# Patient Record
Sex: MALE | Race: WHITE | NOT HISPANIC OR LATINO | Employment: OTHER | URBAN - METROPOLITAN AREA
[De-identification: names, ages, dates, MRNs, and addresses within clinical notes are randomized per-mention and may not be internally consistent; named-entity substitution may affect disease eponyms.]

---

## 2017-05-31 ENCOUNTER — LAB VISIT (OUTPATIENT)
Dept: LAB | Facility: HOSPITAL | Age: 72
End: 2017-05-31
Attending: INTERNAL MEDICINE
Payer: MEDICARE

## 2017-05-31 DIAGNOSIS — C61 PROSTATE CANCER: ICD-10-CM

## 2017-05-31 DIAGNOSIS — Z12.5 SCREENING FOR PROSTATE CANCER: ICD-10-CM

## 2017-05-31 DIAGNOSIS — C7A.012 MALIGNANT CARCINOID TUMOR OF THE ILEUM: ICD-10-CM

## 2017-05-31 LAB
ALBUMIN SERPL BCP-MCNC: 4.4 G/DL
ALP SERPL-CCNC: 56 U/L
ALT SERPL W/O P-5'-P-CCNC: 47 U/L
ANION GAP SERPL CALC-SCNC: 8 MMOL/L
AST SERPL-CCNC: 39 U/L
BASOPHILS # BLD AUTO: 0.02 K/UL
BASOPHILS NFR BLD: 0.4 %
BILIRUB SERPL-MCNC: 0.6 MG/DL
BUN SERPL-MCNC: 18 MG/DL
CALCIUM SERPL-MCNC: 9.5 MG/DL
CHLORIDE SERPL-SCNC: 105 MMOL/L
CO2 SERPL-SCNC: 29 MMOL/L
COMPLEXED PSA SERPL-MCNC: <0.07 NG/ML
CREAT SERPL-MCNC: 1.18 MG/DL
DIFFERENTIAL METHOD: ABNORMAL
EOSINOPHIL # BLD AUTO: 0.2 K/UL
EOSINOPHIL NFR BLD: 3.2 %
ERYTHROCYTE [DISTWIDTH] IN BLOOD BY AUTOMATED COUNT: 13.9 %
EST. GFR  (AFRICAN AMERICAN): >60 ML/MIN/1.73 M^2
EST. GFR  (NON AFRICAN AMERICAN): >60 ML/MIN/1.73 M^2
GLUCOSE SERPL-MCNC: 91 MG/DL
HCT VFR BLD AUTO: 39.3 %
HGB BLD-MCNC: 13 G/DL
LYMPHOCYTES # BLD AUTO: 1.1 K/UL
LYMPHOCYTES NFR BLD: 20.4 %
MCH RBC QN AUTO: 28.4 PG
MCHC RBC AUTO-ENTMCNC: 33.1 %
MCV RBC AUTO: 86 FL
MONOCYTES # BLD AUTO: 0.5 K/UL
MONOCYTES NFR BLD: 9.2 %
NEUTROPHILS # BLD AUTO: 3.5 K/UL
NEUTROPHILS NFR BLD: 66.8 %
NRBC BLD-RTO: 0 /100 WBC
PLATELET # BLD AUTO: 213 K/UL
PMV BLD AUTO: 8.8 FL
POTASSIUM SERPL-SCNC: 4.8 MMOL/L
PROT SERPL-MCNC: 7.4 G/DL
RBC # BLD AUTO: 4.57 M/UL
SODIUM SERPL-SCNC: 142 MMOL/L
WBC # BLD AUTO: 5.24 K/UL

## 2017-05-31 PROCEDURE — 84153 ASSAY OF PSA TOTAL: CPT

## 2017-05-31 PROCEDURE — 36415 COLL VENOUS BLD VENIPUNCTURE: CPT | Mod: PN

## 2017-05-31 PROCEDURE — 80053 COMPREHEN METABOLIC PANEL: CPT | Mod: PN

## 2017-05-31 PROCEDURE — 84153 ASSAY OF PSA TOTAL: CPT | Mod: PN

## 2017-05-31 PROCEDURE — 85025 COMPLETE CBC W/AUTO DIFF WBC: CPT

## 2017-05-31 PROCEDURE — 85025 COMPLETE CBC W/AUTO DIFF WBC: CPT | Mod: PN

## 2017-05-31 PROCEDURE — 80053 COMPREHEN METABOLIC PANEL: CPT

## 2017-11-10 ENCOUNTER — OFFICE VISIT (OUTPATIENT)
Dept: UROLOGY | Facility: CLINIC | Age: 72
End: 2017-11-10
Payer: MEDICARE

## 2017-11-10 VITALS
DIASTOLIC BLOOD PRESSURE: 92 MMHG | WEIGHT: 184.94 LBS | HEIGHT: 67 IN | BODY MASS INDEX: 29.03 KG/M2 | HEART RATE: 60 BPM | SYSTOLIC BLOOD PRESSURE: 173 MMHG

## 2017-11-10 DIAGNOSIS — N39.3 SUI (STRESS URINARY INCONTINENCE), MALE: ICD-10-CM

## 2017-11-10 DIAGNOSIS — C61 PROSTATE CANCER: Primary | ICD-10-CM

## 2017-11-10 LAB
BILIRUB SERPL-MCNC: NORMAL MG/DL
BLOOD URINE, POC: NORMAL
COLOR, POC UA: NORMAL
GLUCOSE UR QL STRIP: NORMAL
KETONES UR QL STRIP: NORMAL
LEUKOCYTE ESTERASE URINE, POC: NORMAL
NITRITE, POC UA: NORMAL
PH, POC UA: 6
PROTEIN, POC: NORMAL
SPECIFIC GRAVITY, POC UA: 1.01
UROBILINOGEN, POC UA: NORMAL

## 2017-11-10 PROCEDURE — 99204 OFFICE O/P NEW MOD 45 MIN: CPT | Mod: S$PBB,,, | Performed by: UROLOGY

## 2017-11-10 PROCEDURE — 81002 URINALYSIS NONAUTO W/O SCOPE: CPT | Mod: PBBFAC,PO | Performed by: UROLOGY

## 2017-11-10 PROCEDURE — 99213 OFFICE O/P EST LOW 20 MIN: CPT | Mod: PBBFAC,PO | Performed by: UROLOGY

## 2017-11-10 PROCEDURE — 99999 PR PBB SHADOW E&M-EST. PATIENT-LVL III: CPT | Mod: PBBFAC,,, | Performed by: UROLOGY

## 2017-11-10 RX ORDER — AZELASTINE 1 MG/ML
SPRAY, METERED NASAL
COMMUNITY
Start: 2017-09-29 | End: 2017-11-10

## 2017-11-10 RX ORDER — ATORVASTATIN CALCIUM 20 MG/1
TABLET, FILM COATED ORAL
COMMUNITY
Start: 2017-09-16 | End: 2017-11-10

## 2017-11-10 RX ORDER — ATORVASTATIN CALCIUM 40 MG/1
40 TABLET, FILM COATED ORAL DAILY
COMMUNITY
Start: 2017-10-26

## 2017-11-10 RX ORDER — AMOXICILLIN AND CLAVULANATE POTASSIUM 500; 125 MG/1; MG/1
TABLET, FILM COATED ORAL
COMMUNITY
Start: 2017-09-29 | End: 2017-11-10
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
## 2017-12-04 ENCOUNTER — LAB VISIT (OUTPATIENT)
Dept: LAB | Facility: HOSPITAL | Age: 72
End: 2017-12-04
Attending: INTERNAL MEDICINE
Payer: MEDICARE

## 2017-12-04 DIAGNOSIS — C61 PROSTATE CANCER: ICD-10-CM

## 2017-12-04 DIAGNOSIS — D3A.012 CARCINOID TUMOR OF ILEUM: ICD-10-CM

## 2017-12-04 DIAGNOSIS — D50.9 IRON DEFICIENCY ANEMIA, UNSPECIFIED IRON DEFICIENCY ANEMIA TYPE: ICD-10-CM

## 2017-12-04 LAB
ALBUMIN SERPL BCP-MCNC: 4.3 G/DL
ALP SERPL-CCNC: 54 U/L
ALT SERPL W/O P-5'-P-CCNC: 35 U/L
ANION GAP SERPL CALC-SCNC: 8 MMOL/L
AST SERPL-CCNC: 30 U/L
BASOPHILS # BLD AUTO: 0.02 K/UL
BASOPHILS NFR BLD: 0.4 %
BILIRUB SERPL-MCNC: 0.7 MG/DL
BUN SERPL-MCNC: 17 MG/DL
CALCIUM SERPL-MCNC: 9.9 MG/DL
CHLORIDE SERPL-SCNC: 106 MMOL/L
CO2 SERPL-SCNC: 27 MMOL/L
COMPLEXED PSA SERPL-MCNC: <0.07 NG/ML
CREAT SERPL-MCNC: 1.22 MG/DL
DIFFERENTIAL METHOD: ABNORMAL
EOSINOPHIL # BLD AUTO: 0.2 K/UL
EOSINOPHIL NFR BLD: 3.5 %
ERYTHROCYTE [DISTWIDTH] IN BLOOD BY AUTOMATED COUNT: 13.7 %
EST. GFR  (AFRICAN AMERICAN): >60 ML/MIN/1.73 M^2
EST. GFR  (NON AFRICAN AMERICAN): 59 ML/MIN/1.73 M^2
FERRITIN SERPL-MCNC: 71 NG/ML
GLUCOSE SERPL-MCNC: 93 MG/DL
HCT VFR BLD AUTO: 38.9 %
HGB BLD-MCNC: 12.7 G/DL
IRON SATN MFR SERPL: 15 %
IRON SERPL-MCNC: 56 UG/DL
LYMPHOCYTES # BLD AUTO: 1 K/UL
LYMPHOCYTES NFR BLD: 20.2 %
MCH RBC QN AUTO: 28.1 PG
MCHC RBC AUTO-ENTMCNC: 32.6 G/DL
MCV RBC AUTO: 86 FL
MONOCYTES # BLD AUTO: 0.4 K/UL
MONOCYTES NFR BLD: 8.7 %
NEUTROPHILS # BLD AUTO: 3.2 K/UL
NEUTROPHILS NFR BLD: 67.2 %
NRBC BLD-RTO: 0 /100 WBC
PLATELET # BLD AUTO: 199 K/UL
PMV BLD AUTO: 9 FL
POTASSIUM SERPL-SCNC: 4.4 MMOL/L
PROT SERPL-MCNC: 7.7 G/DL
RBC # BLD AUTO: 4.52 M/UL
SODIUM SERPL-SCNC: 141 MMOL/L
TOTAL IRON BINDING CAPACITY: 367 UG/DL
WBC # BLD AUTO: 4.81 K/UL

## 2017-12-04 PROCEDURE — 83540 ASSAY OF IRON: CPT | Mod: PN

## 2017-12-04 PROCEDURE — 84153 ASSAY OF PSA TOTAL: CPT | Mod: PN

## 2017-12-04 PROCEDURE — 85025 COMPLETE CBC W/AUTO DIFF WBC: CPT

## 2017-12-04 PROCEDURE — 36415 COLL VENOUS BLD VENIPUNCTURE: CPT | Mod: PN

## 2017-12-04 PROCEDURE — 84153 ASSAY OF PSA TOTAL: CPT

## 2017-12-04 PROCEDURE — 80053 COMPREHEN METABOLIC PANEL: CPT | Mod: PN

## 2017-12-04 PROCEDURE — 80053 COMPREHEN METABOLIC PANEL: CPT

## 2017-12-04 PROCEDURE — 82728 ASSAY OF FERRITIN: CPT

## 2017-12-04 PROCEDURE — 85025 COMPLETE CBC W/AUTO DIFF WBC: CPT | Mod: PN

## 2017-12-04 PROCEDURE — 82728 ASSAY OF FERRITIN: CPT | Mod: PN

## 2017-12-04 PROCEDURE — 83540 ASSAY OF IRON: CPT

## 2018-01-09 ENCOUNTER — OFFICE VISIT (OUTPATIENT)
Dept: UROLOGY | Facility: CLINIC | Age: 73
End: 2018-01-09
Payer: MEDICARE

## 2018-01-09 VITALS — BODY MASS INDEX: 28.79 KG/M2 | WEIGHT: 183.44 LBS | RESPIRATION RATE: 18 BRPM | HEIGHT: 67 IN

## 2018-01-09 DIAGNOSIS — N39.3 SUI (STRESS URINARY INCONTINENCE), MALE: ICD-10-CM

## 2018-01-09 PROCEDURE — 99999 PR PBB SHADOW E&M-EST. PATIENT-LVL III: CPT | Mod: PBBFAC,,, | Performed by: UROLOGY

## 2018-01-09 PROCEDURE — 99213 OFFICE O/P EST LOW 20 MIN: CPT | Mod: PBBFAC | Performed by: UROLOGY

## 2018-01-09 PROCEDURE — 99214 OFFICE O/P EST MOD 30 MIN: CPT | Mod: S$PBB,,, | Performed by: UROLOGY

## 2018-01-09 RX ORDER — CIPROFLOXACIN 250 MG/1
500 TABLET, FILM COATED ORAL ONCE
Status: CANCELLED | OUTPATIENT
Start: 2018-01-09 | End: 2018-01-09

## 2018-01-09 RX ORDER — IMIPRAMINE HYDROCHLORIDE 25 MG/1
25 TABLET, FILM COATED ORAL 3 TIMES DAILY
Qty: 90 TABLET | Refills: 11 | Status: SHIPPED | OUTPATIENT
Start: 2018-01-09 | End: 2023-07-28

## 2018-01-09 RX ORDER — LIDOCAINE HYDROCHLORIDE 20 MG/ML
JELLY TOPICAL ONCE
Status: CANCELLED | OUTPATIENT
Start: 2018-01-09 | End: 2018-01-09

## 2018-01-09 RX ORDER — IMIPRAMINE HYDROCHLORIDE 25 MG/1
25 TABLET, FILM COATED ORAL 3 TIMES DAILY
Qty: 90 TABLET | Refills: 11 | Status: SHIPPED | OUTPATIENT
Start: 2018-01-09 | End: 2018-01-09 | Stop reason: SDUPTHER
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
## 2018-02-06 ENCOUNTER — PROCEDURE VISIT (OUTPATIENT)
Dept: UROLOGY | Facility: CLINIC | Age: 73
End: 2018-02-06
Payer: MEDICARE

## 2018-02-06 VITALS
RESPIRATION RATE: 16 BRPM | WEIGHT: 180.31 LBS | TEMPERATURE: 99 F | HEART RATE: 65 BPM | HEIGHT: 67 IN | BODY MASS INDEX: 28.3 KG/M2 | SYSTOLIC BLOOD PRESSURE: 160 MMHG | DIASTOLIC BLOOD PRESSURE: 76 MMHG

## 2018-02-06 DIAGNOSIS — N39.3 SUI (STRESS URINARY INCONTINENCE), MALE: ICD-10-CM

## 2018-02-06 PROCEDURE — 51701 INSERT BLADDER CATHETER: CPT | Mod: PBBFAC | Performed by: UROLOGY

## 2018-02-06 PROCEDURE — 51728 CYSTOMETROGRAM W/VP: CPT | Mod: 26,S$PBB,, | Performed by: UROLOGY

## 2018-02-06 PROCEDURE — 51784 ANAL/URINARY MUSCLE STUDY: CPT | Mod: PBBFAC | Performed by: UROLOGY

## 2018-02-06 PROCEDURE — 52000 CYSTOURETHROSCOPY: CPT | Mod: S$PBB,59,, | Performed by: UROLOGY

## 2018-02-06 PROCEDURE — 51728 CYSTOMETROGRAM W/VP: CPT | Mod: PBBFAC | Performed by: UROLOGY

## 2018-02-06 PROCEDURE — 51741 ELECTRO-UROFLOWMETRY FIRST: CPT | Mod: 26,51,S$PBB, | Performed by: UROLOGY

## 2018-02-06 PROCEDURE — 51741 ELECTRO-UROFLOWMETRY FIRST: CPT | Mod: PBBFAC | Performed by: UROLOGY

## 2018-02-06 PROCEDURE — 52000 CYSTOURETHROSCOPY: CPT | Mod: PBBFAC | Performed by: UROLOGY

## 2018-02-06 PROCEDURE — 51784 ANAL/URINARY MUSCLE STUDY: CPT | Mod: 26,51,S$PBB, | Performed by: UROLOGY

## 2018-02-06 PROCEDURE — 51797 INTRAABDOMINAL PRESSURE TEST: CPT | Mod: 26,S$PBB,, | Performed by: UROLOGY

## 2018-02-06 RX ORDER — LIDOCAINE HYDROCHLORIDE 20 MG/ML
JELLY TOPICAL ONCE
Status: COMPLETED | OUTPATIENT
Start: 2018-02-06 | End: 2018-02-06

## 2018-02-06 RX ORDER — CIPROFLOXACIN 250 MG/1
500 TABLET, FILM COATED ORAL ONCE
Status: COMPLETED | OUTPATIENT
Start: 2018-02-06 | End: 2018-02-06

## 2018-02-06 RX ADMIN — CIPROFLOXACIN 500 MG: 250 TABLET, FILM COATED ORAL at 02:02

## 2018-02-06 RX ADMIN — LIDOCAINE HYDROCHLORIDE: 20 JELLY TOPICAL at 01:02

## 2018-02-06 NOTE — PROCEDURES
Simple Urodynamics w/ Cysto  Date/Time: 2/6/2018 5:16 PM  Performed by: SOHAM DAVIS.  Authorized by: SOHAM DAVIS.   Comments: Procedure Date:  02/06/2018    Procedure:   Diagnostic Cystourethroscopy   Complex Cystometrogram   Voiding / Pressure Study with Intrarectal Balloon   Complex Uroflow   Electromyogram of Anal Sphincter.     Pre-OP Diagnosis:   SUNNI, s/p RALP and XRT for prostate cancer   Post-OP Diagnosis:   same   Anesthesia:   Anesthesia Administered:   Intraurethral instillation of 10 mL 2% lidocaine (Xylocaine) jelly.   Findings:   --- Bladder ---   CYSTOMETROGRAM ( Filling Phase ):   Cystometric Numeric Data:   - First Desire (Sensation): 142 mL at 5cm of water.   - Normal Desire: 183mL at 4 cm of water.   - Strong Desire: 230 mL at 6 cm of water.   - Urgency (Imminent Void) : 273 mL at 18 cm of water.   - Maximum Cystometric Capacity: 274 mL.   Compliance:   - low.   Leak Point Pressure:   - Valsalva ( Abdominal ) Leak Point Pressure: no demonstrable leakage.   UROFLOW:   - Voided Volume: 291 mL.   - Residual Urine: 5 mL.   - Maximum Flow Rate: 30 mL/sec.   - Flow Pattern: normal  VOIDING PRESSURE STUDY ( Voiding Phase ):   Detrusor Pressure:   - Maximum Detrusor Pressure: 40 cm of water.   - Detrusor Pressure at Maximum Flow: 22 cm of water.   - Detrusor Contraction Characteristics: Sustained contraction(s).   ELECTROMYOGRAM:   - normal.     ---Diagnostic Cystourethroscopy ---   Normal urethra.    Prostate absent  Width of Bladder Neck Opening: Approximately 18 Fr.   Normal bladder.   Normal ureteral orifices bilaterally.       Description of Procedure:   Informed Consent:   - Risks, benefits and alternatives of procedure discussed with   patient and informed consent obtained.   Patient Position:   - Supine.   --- Bladder ---   Prep and Drape:   - Patient prepped and draped in usual sterile fashion using povidone   iodine (Betadine).   --- Diagnostic Cystourethroscopy ---   Instruments:   - 16 Fr  flexible cystoscope with 0 degree lens.   Procedure Details:   - Cystoscope passed under vision into bladder.   - Bladder and urethra examined in their entirety with findings as   above.   --- Urodynamic Studies ---   Procedure Details:   Cystometrogram:   - Catheter(s) passed into the bladder.   - Rectal balloon inserted.   - Catheter(s) connected to infusion medium and to pressure recording   device.   - Infusion Rate: 30 mL / min.   Electromyogram:   - Perineal electromyogram pad placed and connected to electromyogram   recording device.   Equipment:   - Catheters: Double lumen catheter.   - Medium: Liquid.   - Pressure Recording Device: Calibrated electronic equipment.   Complications:   No immediate complications.    CONCLUSIONS:   1. SUNNI  Responded well to medical therapy but not perfect.  Now Pad use is down to 1 pad a day.  Recommend male sling surgery.  Nature and risks of operation explained.  He will review the brochure with DVD given.  Will confirm the surgery.    Post-OP Plan:   Patient was discharged home in a stable condition.  Medications: cipro   Follow up:  Male sling surgery

## 2018-02-06 NOTE — PATIENT INSTRUCTIONS
SIMPLE URODYNAMIC STUDY (SUDS) & CYSTOSCOPY  UROLOGY CLINIC DISCHARGE INSTRUCTIONS    You have had a procedure that will require time to properly heal. Follow the instructions you have been given on how to care for yourself once you are home. Below is additional information to help in your recovery.    ACTIVITY  · There are no restrictions in activity. Start doing again the things you did before the procedure.  · You may experience a slight burning sensation. You may notice a small amount of blood in your urine. This will clear up within a day. Call the clinic if this continues beyond 48 hours.    DIET  · Continue your normal diet. You may eat the same foods you ate before your procedure.  · Drink plenty of fluids during the first 24-48 hours following your procedure.    MEDICATIONS  · Resume all other previous medications from your prescribing physician.  · Continue any pre=procedure antibiotics until they are all gone.    SIGNS AND SYMPTOMS TO REPORT TO THE DOCTOR  · Chills or fever greater than 101° F within 24 hours of procedure.  · Changes in urination, such as increased bleeding, foul smell, cloudy urine, or painful urination.  · Call your doctor with any questions or concerns.    For any emergency situation, call 221 immediately or go to your nearest emergency room.    [facility]  [phone]  AFTER HOURS AND ON WEEKENDS CALL [phone] AND ASK TO SPEAK WITH A UROLOGY RESIDENT WITH ANY QUESTIONS OR CONCERNS

## 2018-06-04 PROBLEM — C43.4 MALIGNANT MELANOMA OF SKIN OF SCALP AND NECK: Status: ACTIVE | Noted: 2018-06-04

## 2019-01-02 ENCOUNTER — LAB VISIT (OUTPATIENT)
Dept: LAB | Facility: HOSPITAL | Age: 74
End: 2019-01-02
Attending: INTERNAL MEDICINE
Payer: MEDICARE

## 2019-01-02 DIAGNOSIS — D3A.012 CARCINOID TUMOR OF ILEUM: ICD-10-CM

## 2019-01-02 DIAGNOSIS — C43.4 MALIGNANT MELANOMA OF SKIN OF SCALP AND NECK: ICD-10-CM

## 2019-01-02 DIAGNOSIS — C61 PROSTATE CANCER: ICD-10-CM

## 2019-01-02 LAB
ALBUMIN SERPL BCP-MCNC: 4.1 G/DL
ALP SERPL-CCNC: 63 U/L
ALT SERPL W/O P-5'-P-CCNC: 26 U/L
ANION GAP SERPL CALC-SCNC: 8 MMOL/L
AST SERPL-CCNC: 27 U/L
BASOPHILS # BLD AUTO: 0.02 K/UL
BASOPHILS NFR BLD: 0.4 %
BILIRUB SERPL-MCNC: 0.5 MG/DL
BUN SERPL-MCNC: 16 MG/DL
CALCIUM SERPL-MCNC: 9.4 MG/DL
CHLORIDE SERPL-SCNC: 108 MMOL/L
CO2 SERPL-SCNC: 26 MMOL/L
COMPLEXED PSA SERPL-MCNC: <0.07 NG/ML
CREAT SERPL-MCNC: 1.08 MG/DL
DIFFERENTIAL METHOD: ABNORMAL
EOSINOPHIL # BLD AUTO: 0.2 K/UL
EOSINOPHIL NFR BLD: 2.9 %
ERYTHROCYTE [DISTWIDTH] IN BLOOD BY AUTOMATED COUNT: 13.9 %
EST. GFR  (AFRICAN AMERICAN): >60 ML/MIN/1.73 M^2
EST. GFR  (NON AFRICAN AMERICAN): >60 ML/MIN/1.73 M^2
GLUCOSE SERPL-MCNC: 93 MG/DL
HCT VFR BLD AUTO: 38.3 %
HGB BLD-MCNC: 12.4 G/DL
LYMPHOCYTES # BLD AUTO: 1.2 K/UL
LYMPHOCYTES NFR BLD: 23.5 %
MCH RBC QN AUTO: 27.5 PG
MCHC RBC AUTO-ENTMCNC: 32.4 G/DL
MCV RBC AUTO: 85 FL
MONOCYTES # BLD AUTO: 0.5 K/UL
MONOCYTES NFR BLD: 8.6 %
NEUTROPHILS # BLD AUTO: 3.4 K/UL
NEUTROPHILS NFR BLD: 64.6 %
NRBC BLD-RTO: 0 /100 WBC
PLATELET # BLD AUTO: 223 K/UL
PMV BLD AUTO: 8.5 FL
POTASSIUM SERPL-SCNC: 4.2 MMOL/L
PROT SERPL-MCNC: 7.1 G/DL
RBC # BLD AUTO: 4.51 M/UL
SODIUM SERPL-SCNC: 142 MMOL/L
WBC # BLD AUTO: 5.23 K/UL

## 2019-01-02 PROCEDURE — 84153 ASSAY OF PSA TOTAL: CPT

## 2019-01-02 PROCEDURE — 84153 ASSAY OF PSA TOTAL: CPT | Mod: PN

## 2019-01-02 PROCEDURE — 85025 COMPLETE CBC W/AUTO DIFF WBC: CPT | Mod: PN

## 2019-01-02 PROCEDURE — 80053 COMPREHEN METABOLIC PANEL: CPT | Mod: PN

## 2019-01-02 PROCEDURE — 80053 COMPREHEN METABOLIC PANEL: CPT

## 2019-01-02 PROCEDURE — 36415 COLL VENOUS BLD VENIPUNCTURE: CPT | Mod: PN

## 2019-01-02 PROCEDURE — 85025 COMPLETE CBC W/AUTO DIFF WBC: CPT

## 2019-06-26 ENCOUNTER — LAB VISIT (OUTPATIENT)
Dept: LAB | Facility: HOSPITAL | Age: 74
End: 2019-06-26
Attending: INTERNAL MEDICINE
Payer: MEDICARE

## 2019-06-26 DIAGNOSIS — C43.4 MALIGNANT MELANOMA OF SKIN OF SCALP AND NECK: ICD-10-CM

## 2019-06-26 DIAGNOSIS — C61 PROSTATE CANCER: ICD-10-CM

## 2019-06-26 DIAGNOSIS — D3A.012 CARCINOID TUMOR OF ILEUM: ICD-10-CM

## 2019-06-26 LAB
ALBUMIN SERPL BCP-MCNC: 4.3 G/DL (ref 3.5–5.2)
ALP SERPL-CCNC: 65 U/L (ref 38–145)
ALT SERPL W/O P-5'-P-CCNC: 23 U/L (ref 10–44)
ANION GAP SERPL CALC-SCNC: 9 MMOL/L (ref 8–16)
AST SERPL-CCNC: 32 U/L (ref 17–59)
BASOPHILS # BLD AUTO: 0.02 K/UL (ref 0–0.2)
BASOPHILS NFR BLD: 0.4 % (ref 0–1.9)
BILIRUB SERPL-MCNC: 0.6 MG/DL (ref 0.2–1.3)
BUN SERPL-MCNC: 18 MG/DL (ref 9–21)
CALCIUM SERPL-MCNC: 10.1 MG/DL (ref 8.4–10.2)
CHLORIDE SERPL-SCNC: 104 MMOL/L (ref 95–110)
CO2 SERPL-SCNC: 28 MMOL/L (ref 22–31)
COMPLEXED PSA SERPL-MCNC: <0.07 NG/ML (ref 0–4)
CREAT SERPL-MCNC: 1.2 MG/DL (ref 0.5–1.4)
DIFFERENTIAL METHOD: ABNORMAL
EOSINOPHIL # BLD AUTO: 0.2 K/UL (ref 0–0.5)
EOSINOPHIL NFR BLD: 4.3 % (ref 0–8)
ERYTHROCYTE [DISTWIDTH] IN BLOOD BY AUTOMATED COUNT: 14.4 % (ref 11.5–14.5)
EST. GFR  (AFRICAN AMERICAN): >60 ML/MIN/1.73 M^2
EST. GFR  (NON AFRICAN AMERICAN): 59 ML/MIN/1.73 M^2
GLUCOSE SERPL-MCNC: 95 MG/DL (ref 70–110)
HCT VFR BLD AUTO: 39 % (ref 40–54)
HGB BLD-MCNC: 13.1 G/DL (ref 14–18)
IMM GRANULOCYTES # BLD AUTO: 0.01 K/UL (ref 0–0.04)
IMM GRANULOCYTES NFR BLD AUTO: 0.2 % (ref 0–0.5)
LYMPHOCYTES # BLD AUTO: 1.3 K/UL (ref 1–4.8)
LYMPHOCYTES NFR BLD: 25.8 % (ref 18–48)
MCH RBC QN AUTO: 28.7 PG (ref 27–31)
MCHC RBC AUTO-ENTMCNC: 33.6 G/DL (ref 32–36)
MCV RBC AUTO: 85 FL (ref 82–98)
MONOCYTES # BLD AUTO: 0.5 K/UL (ref 0.3–1)
MONOCYTES NFR BLD: 10.5 % (ref 4–15)
NEUTROPHILS # BLD AUTO: 3 K/UL (ref 1.8–7.7)
NEUTROPHILS NFR BLD: 58.8 % (ref 38–73)
NRBC BLD-RTO: 0 /100 WBC
PLATELET # BLD AUTO: 218 K/UL (ref 150–350)
PMV BLD AUTO: 8.6 FL (ref 9.2–12.9)
POTASSIUM SERPL-SCNC: 4.2 MMOL/L (ref 3.5–5.1)
PROT SERPL-MCNC: 7.6 G/DL (ref 6–8.4)
RBC # BLD AUTO: 4.57 M/UL (ref 4.6–6.2)
SODIUM SERPL-SCNC: 141 MMOL/L (ref 136–145)
WBC # BLD AUTO: 5.07 K/UL (ref 3.9–12.7)

## 2019-06-26 PROCEDURE — 80053 COMPREHEN METABOLIC PANEL: CPT

## 2019-06-26 PROCEDURE — 84153 ASSAY OF PSA TOTAL: CPT

## 2019-06-26 PROCEDURE — 84153 ASSAY OF PSA TOTAL: CPT | Mod: PN

## 2019-06-26 PROCEDURE — 85025 COMPLETE CBC W/AUTO DIFF WBC: CPT

## 2019-06-26 PROCEDURE — 36415 COLL VENOUS BLD VENIPUNCTURE: CPT | Mod: PN

## 2019-06-26 PROCEDURE — 80053 COMPREHEN METABOLIC PANEL: CPT | Mod: PN

## 2019-06-26 PROCEDURE — 85025 COMPLETE CBC W/AUTO DIFF WBC: CPT | Mod: PN

## 2020-01-03 ENCOUNTER — LAB VISIT (OUTPATIENT)
Dept: LAB | Facility: HOSPITAL | Age: 75
End: 2020-01-03
Attending: PHYSICIAN ASSISTANT
Payer: MEDICARE

## 2020-01-03 DIAGNOSIS — C43.4 MALIGNANT MELANOMA OF SKIN OF SCALP AND NECK: ICD-10-CM

## 2020-01-03 DIAGNOSIS — D50.9 IRON DEFICIENCY ANEMIA, UNSPECIFIED IRON DEFICIENCY ANEMIA TYPE: ICD-10-CM

## 2020-01-03 DIAGNOSIS — C61 PROSTATE CANCER: ICD-10-CM

## 2020-01-03 LAB
ALBUMIN SERPL BCP-MCNC: 4.2 G/DL (ref 3.5–5.2)
ALP SERPL-CCNC: 76 U/L (ref 38–145)
ALT SERPL W/O P-5'-P-CCNC: 24 U/L (ref 10–44)
ANION GAP SERPL CALC-SCNC: 8 MMOL/L (ref 8–16)
AST SERPL-CCNC: 32 U/L (ref 17–59)
BASOPHILS # BLD AUTO: 0.02 K/UL (ref 0–0.2)
BASOPHILS NFR BLD: 0.4 % (ref 0–1.9)
BILIRUB SERPL-MCNC: 0.9 MG/DL (ref 0.2–1.3)
BUN SERPL-MCNC: 17 MG/DL (ref 9–21)
CALCIUM SERPL-MCNC: 9.5 MG/DL (ref 8.4–10.2)
CHLORIDE SERPL-SCNC: 106 MMOL/L (ref 95–110)
CO2 SERPL-SCNC: 28 MMOL/L (ref 22–31)
COMPLEXED PSA SERPL-MCNC: <0.07 NG/ML (ref 0–4)
CREAT SERPL-MCNC: 0.97 MG/DL (ref 0.5–1.4)
DIFFERENTIAL METHOD: ABNORMAL
EOSINOPHIL # BLD AUTO: 0.2 K/UL (ref 0–0.5)
EOSINOPHIL NFR BLD: 3.2 % (ref 0–8)
ERYTHROCYTE [DISTWIDTH] IN BLOOD BY AUTOMATED COUNT: 14.6 % (ref 11.5–14.5)
EST. GFR  (AFRICAN AMERICAN): >60 ML/MIN/1.73 M^2
EST. GFR  (NON AFRICAN AMERICAN): >60 ML/MIN/1.73 M^2
FERRITIN SERPL-MCNC: 29 NG/ML (ref 18–464)
GLUCOSE SERPL-MCNC: 101 MG/DL (ref 70–110)
HCT VFR BLD AUTO: 40.9 % (ref 40–54)
HGB BLD-MCNC: 13.3 G/DL (ref 14–18)
IMM GRANULOCYTES # BLD AUTO: 0.01 K/UL (ref 0–0.04)
IMM GRANULOCYTES NFR BLD AUTO: 0.2 % (ref 0–0.5)
IRON SATN MFR SERPL: 23 % (ref 20–50)
IRON SERPL-MCNC: 76 UG/DL (ref 45–160)
LDH SERPL L TO P-CCNC: 372 U/L (ref 313–618)
LYMPHOCYTES # BLD AUTO: 1.1 K/UL (ref 1–4.8)
LYMPHOCYTES NFR BLD: 23.4 % (ref 18–48)
MCH RBC QN AUTO: 27.8 PG (ref 27–31)
MCHC RBC AUTO-ENTMCNC: 32.5 G/DL (ref 32–36)
MCV RBC AUTO: 86 FL (ref 82–98)
MONOCYTES # BLD AUTO: 0.5 K/UL (ref 0.3–1)
MONOCYTES NFR BLD: 10.3 % (ref 4–15)
NEUTROPHILS # BLD AUTO: 3 K/UL (ref 1.8–7.7)
NEUTROPHILS NFR BLD: 62.5 % (ref 38–73)
NRBC BLD-RTO: 0 /100 WBC
PLATELET # BLD AUTO: 187 K/UL (ref 150–350)
PMV BLD AUTO: 8.6 FL (ref 9.2–12.9)
POTASSIUM SERPL-SCNC: 4.3 MMOL/L (ref 3.5–5.1)
PROT SERPL-MCNC: 7.5 G/DL (ref 6–8.4)
RBC # BLD AUTO: 4.78 M/UL (ref 4.6–6.2)
SODIUM SERPL-SCNC: 142 MMOL/L (ref 136–145)
TOTAL IRON BINDING CAPACITY: 331 UG/DL (ref 261–462)
WBC # BLD AUTO: 4.74 K/UL (ref 3.9–12.7)

## 2020-01-03 PROCEDURE — 82728 ASSAY OF FERRITIN: CPT | Mod: PN

## 2020-01-03 PROCEDURE — 80053 COMPREHEN METABOLIC PANEL: CPT

## 2020-01-03 PROCEDURE — 83615 LACTATE (LD) (LDH) ENZYME: CPT

## 2020-01-03 PROCEDURE — 84153 ASSAY OF PSA TOTAL: CPT

## 2020-01-03 PROCEDURE — 84153 ASSAY OF PSA TOTAL: CPT | Mod: PN

## 2020-01-03 PROCEDURE — 83615 LACTATE (LD) (LDH) ENZYME: CPT | Mod: PN

## 2020-01-03 PROCEDURE — 83540 ASSAY OF IRON: CPT

## 2020-01-03 PROCEDURE — 36415 COLL VENOUS BLD VENIPUNCTURE: CPT | Mod: PN

## 2020-01-03 PROCEDURE — 82728 ASSAY OF FERRITIN: CPT

## 2020-01-03 PROCEDURE — 80053 COMPREHEN METABOLIC PANEL: CPT | Mod: PN

## 2020-01-03 PROCEDURE — 85025 COMPLETE CBC W/AUTO DIFF WBC: CPT | Mod: PN

## 2020-01-03 PROCEDURE — 85025 COMPLETE CBC W/AUTO DIFF WBC: CPT

## 2020-01-03 PROCEDURE — 83540 ASSAY OF IRON: CPT | Mod: PN

## 2020-07-02 ENCOUNTER — LAB VISIT (OUTPATIENT)
Dept: LAB | Facility: HOSPITAL | Age: 75
End: 2020-07-02
Attending: PHYSICIAN ASSISTANT
Payer: MEDICARE

## 2020-07-02 DIAGNOSIS — C61 PROSTATE CANCER: ICD-10-CM

## 2020-07-02 DIAGNOSIS — C43.4 MALIGNANT MELANOMA OF SKIN OF SCALP AND NECK: ICD-10-CM

## 2020-07-02 DIAGNOSIS — D50.9 IRON DEFICIENCY ANEMIA, UNSPECIFIED IRON DEFICIENCY ANEMIA TYPE: ICD-10-CM

## 2020-07-02 DIAGNOSIS — D3A.012 CARCINOID TUMOR OF ILEUM, UNSPECIFIED WHETHER MALIGNANT: ICD-10-CM

## 2020-07-02 LAB
ALBUMIN SERPL BCP-MCNC: 4.3 G/DL (ref 3.5–5.2)
ALP SERPL-CCNC: 99 U/L (ref 38–145)
ALT SERPL W/O P-5'-P-CCNC: 21 U/L (ref 0–50)
ANION GAP SERPL CALC-SCNC: 6 MMOL/L (ref 8–16)
AST SERPL-CCNC: 35 U/L (ref 17–59)
BASOPHILS # BLD AUTO: 0.02 K/UL (ref 0–0.2)
BASOPHILS NFR BLD: 0.3 % (ref 0–1.9)
BILIRUB SERPL-MCNC: 0.6 MG/DL (ref 0.2–1.3)
BUN SERPL-MCNC: 17 MG/DL (ref 9–21)
CALCIUM SERPL-MCNC: 9.6 MG/DL (ref 8.4–10.2)
CHLORIDE SERPL-SCNC: 105 MMOL/L (ref 95–110)
CO2 SERPL-SCNC: 28 MMOL/L (ref 22–31)
COMPLEXED PSA SERPL-MCNC: <0.06 NG/ML (ref 0–4)
CREAT SERPL-MCNC: 0.87 MG/DL (ref 0.5–1.4)
DIFFERENTIAL METHOD: ABNORMAL
EOSINOPHIL # BLD AUTO: 0.1 K/UL (ref 0–0.5)
EOSINOPHIL NFR BLD: 1.9 % (ref 0–8)
ERYTHROCYTE [DISTWIDTH] IN BLOOD BY AUTOMATED COUNT: 13.8 % (ref 11.5–14.5)
EST. GFR  (AFRICAN AMERICAN): >60 ML/MIN/1.73 M^2
EST. GFR  (NON AFRICAN AMERICAN): >60 ML/MIN/1.73 M^2
FERRITIN SERPL-MCNC: 25 NG/ML (ref 18–464)
GLUCOSE SERPL-MCNC: 97 MG/DL (ref 70–110)
HCT VFR BLD AUTO: 42.6 % (ref 40–54)
HGB BLD-MCNC: 13.5 G/DL (ref 14–18)
IMM GRANULOCYTES # BLD AUTO: 0.01 K/UL (ref 0–0.04)
IMM GRANULOCYTES NFR BLD AUTO: 0.2 % (ref 0–0.5)
IRON SATN MFR SERPL: 19 % (ref 20–50)
IRON SERPL-MCNC: 62 UG/DL (ref 45–160)
LDH SERPL L TO P-CCNC: 355 U/L (ref 313–618)
LYMPHOCYTES # BLD AUTO: 1.1 K/UL (ref 1–4.8)
LYMPHOCYTES NFR BLD: 17.7 % (ref 18–48)
MCH RBC QN AUTO: 27.2 PG (ref 27–31)
MCHC RBC AUTO-ENTMCNC: 31.7 G/DL (ref 32–36)
MCV RBC AUTO: 86 FL (ref 82–98)
MONOCYTES # BLD AUTO: 0.5 K/UL (ref 0.3–1)
MONOCYTES NFR BLD: 8.2 % (ref 4–15)
NEUTROPHILS # BLD AUTO: 4.4 K/UL (ref 1.8–7.7)
NEUTROPHILS NFR BLD: 71.7 % (ref 38–73)
NRBC BLD-RTO: 0 /100 WBC
PLATELET # BLD AUTO: 195 K/UL (ref 150–350)
PMV BLD AUTO: 8.7 FL (ref 9.2–12.9)
POTASSIUM SERPL-SCNC: 4.3 MMOL/L (ref 3.5–5.1)
PROT SERPL-MCNC: 7.7 G/DL (ref 6–8.4)
RBC # BLD AUTO: 4.96 M/UL (ref 4.6–6.2)
SODIUM SERPL-SCNC: 139 MMOL/L (ref 136–145)
TOTAL IRON BINDING CAPACITY: 321 UG/DL (ref 261–462)
WBC # BLD AUTO: 6.2 K/UL (ref 3.9–12.7)

## 2020-07-02 PROCEDURE — 36415 COLL VENOUS BLD VENIPUNCTURE: CPT | Mod: PN

## 2020-07-02 PROCEDURE — 83615 LACTATE (LD) (LDH) ENZYME: CPT | Mod: PN

## 2020-07-02 PROCEDURE — 82728 ASSAY OF FERRITIN: CPT

## 2020-07-02 PROCEDURE — 80053 COMPREHEN METABOLIC PANEL: CPT | Mod: PN

## 2020-07-02 PROCEDURE — 83540 ASSAY OF IRON: CPT

## 2020-07-02 PROCEDURE — 84153 ASSAY OF PSA TOTAL: CPT

## 2020-07-02 PROCEDURE — 85025 COMPLETE CBC W/AUTO DIFF WBC: CPT | Mod: PN

## 2020-07-02 PROCEDURE — 83540 ASSAY OF IRON: CPT | Mod: PN

## 2020-07-02 PROCEDURE — 80053 COMPREHEN METABOLIC PANEL: CPT

## 2020-07-02 PROCEDURE — 85025 COMPLETE CBC W/AUTO DIFF WBC: CPT

## 2020-07-02 PROCEDURE — 84153 ASSAY OF PSA TOTAL: CPT | Mod: PN

## 2020-07-02 PROCEDURE — 83615 LACTATE (LD) (LDH) ENZYME: CPT

## 2020-07-02 PROCEDURE — 82728 ASSAY OF FERRITIN: CPT | Mod: PN

## 2020-12-16 PROBLEM — M79.642 PAIN IN BOTH HANDS: Status: ACTIVE | Noted: 2020-12-16

## 2020-12-16 PROBLEM — M19.131 POST-TRAUMATIC OSTEOARTHRITIS OF RIGHT WRIST: Status: ACTIVE | Noted: 2020-12-16

## 2020-12-16 PROBLEM — Z87.39 H/O: GOUT: Status: ACTIVE | Noted: 2020-12-16

## 2020-12-16 PROBLEM — M79.641 PAIN IN BOTH HANDS: Status: ACTIVE | Noted: 2020-12-16

## 2020-12-16 PROBLEM — Z82.61 FAMILY HISTORY OF RHEUMATOID ARTHRITIS: Status: ACTIVE | Noted: 2020-12-16

## 2021-05-06 ENCOUNTER — PATIENT MESSAGE (OUTPATIENT)
Dept: RESEARCH | Facility: HOSPITAL | Age: 76
End: 2021-05-06

## 2023-01-11 ENCOUNTER — TELEPHONE (OUTPATIENT)
Dept: HEMATOLOGY/ONCOLOGY | Facility: CLINIC | Age: 78
End: 2023-01-11
Payer: MEDICARE

## 2023-01-11 NOTE — NURSING
Received paper referral through email from [facility] to evaluate patient for [facility]. Patient has a 10mm and a 8mm lung nodule on CT imaging from 12/27/22 performed at [facility]. Patient gives us permission to request imaging from [place], [facility], and [facility].     Patient accepted appt date of 1/17 with Dr. Maki. Patient states his current insurance is Medicare part B and has seen [facility] providers with no issue before.     Date, time, and location confirmed.     Oncology Navigation   Intake  Internal / External Referral: External  Date of Referral: 01/11/23  Initial Nurse Navigator Contact: 01/11/23  Referral to Initial Contact Timeline (days): 0  Date Worked: 01/11/23  First Appointment Available: 01/17/23  Appointment Date: 01/17/23  First Available Date vs. Scheduled Date (days): 0  Multiple appointments: No     Treatment  Current Status: Staging work-up             Procedures: CT  CT Schedule Date: 12/27/22                 Acuity      Follow Up  No follow-ups on file.

## 2023-01-11 NOTE — NURSING
Faxed request for recent images to be Powershared to [facility] for upcoming appointment in lung clinic.

## 2023-01-12 DIAGNOSIS — C43.4 MALIGNANT MELANOMA OF SKIN OF SCALP AND NECK: Primary | ICD-10-CM

## 2023-01-16 DIAGNOSIS — R91.1 LUNG NODULE SEEN ON IMAGING STUDY: Primary | ICD-10-CM

## 2023-01-18 ENCOUNTER — TELEPHONE (OUTPATIENT)
Dept: HEMATOLOGY/ONCOLOGY | Facility: CLINIC | Age: 78
End: 2023-01-18
Payer: MEDICARE

## 2023-01-18 NOTE — NURSING
Reached out to patient to reschedule pulmonary visit in our [facility]. Patient accepted next available date of 1/24 with Dr. Maki.   Date, time, and location confirmed. Patient states he has another appt that day, but is going to try and reschedule the other visit. Patient will update us tomorrow if he is unable to reschedule the other visit.

## 2023-01-20 ENCOUNTER — TELEPHONE (OUTPATIENT)
Dept: HEMATOLOGY/ONCOLOGY | Facility: CLINIC | Age: 78
End: 2023-01-20
Payer: MEDICARE

## 2023-01-20 NOTE — NURSING
Reached out to patient to confirm pulmonary visit on Tuesday for 0930.  Date, time, and location confirmed.

## 2023-01-24 ENCOUNTER — OFFICE VISIT (OUTPATIENT)
Dept: PULMONOLOGY | Facility: CLINIC | Age: 78
End: 2023-01-24
Payer: MEDICARE

## 2023-01-24 ENCOUNTER — TELEPHONE (OUTPATIENT)
Dept: HEMATOLOGY/ONCOLOGY | Facility: CLINIC | Age: 78
End: 2023-01-24
Payer: MEDICARE

## 2023-01-24 VITALS
TEMPERATURE: 97 F | BODY MASS INDEX: 28.52 KG/M2 | DIASTOLIC BLOOD PRESSURE: 82 MMHG | HEART RATE: 59 BPM | HEIGHT: 67 IN | SYSTOLIC BLOOD PRESSURE: 148 MMHG | OXYGEN SATURATION: 98 % | WEIGHT: 181.69 LBS | RESPIRATION RATE: 18 BRPM

## 2023-01-24 DIAGNOSIS — D3A.012 CARCINOID TUMOR OF ILEUM, UNSPECIFIED WHETHER MALIGNANT: ICD-10-CM

## 2023-01-24 DIAGNOSIS — C43.4 MALIGNANT MELANOMA OF SKIN OF SCALP AND NECK: Primary | ICD-10-CM

## 2023-01-24 DIAGNOSIS — C61 PROSTATE CANCER: ICD-10-CM

## 2023-01-24 DIAGNOSIS — R91.1 LUNG NODULE SEEN ON IMAGING STUDY: ICD-10-CM

## 2023-01-24 PROCEDURE — 99215 OFFICE O/P EST HI 40 MIN: CPT | Mod: PBBFAC,PN | Performed by: INTERNAL MEDICINE

## 2023-01-24 PROCEDURE — 99999 PR PBB SHADOW E&M-EST. PATIENT-LVL V: ICD-10-PCS | Mod: PBBFAC,,, | Performed by: INTERNAL MEDICINE

## 2023-01-24 PROCEDURE — 99204 PR OFFICE/OUTPT VISIT, NEW, LEVL IV, 45-59 MIN: ICD-10-PCS | Mod: S$PBB,,, | Performed by: INTERNAL MEDICINE

## 2023-01-24 PROCEDURE — 99999 PR PBB SHADOW E&M-EST. PATIENT-LVL V: CPT | Mod: PBBFAC,,, | Performed by: INTERNAL MEDICINE

## 2023-01-24 PROCEDURE — 99204 OFFICE O/P NEW MOD 45 MIN: CPT | Mod: S$PBB,,, | Performed by: INTERNAL MEDICINE

## 2023-01-24 RX ORDER — AMLODIPINE BESYLATE 10 MG/1
10 TABLET ORAL
COMMUNITY
Start: 2022-11-19

## 2023-01-24 RX ORDER — FUROSEMIDE 40 MG/1
40 TABLET ORAL EVERY MORNING
COMMUNITY
Start: 2023-01-10 | End: 2023-07-28

## 2023-01-24 RX ORDER — POTASSIUM CHLORIDE 750 MG/1
10 TABLET, EXTENDED RELEASE ORAL
COMMUNITY
Start: 2023-01-10

## 2023-01-24 RX ORDER — CLOPIDOGREL BISULFATE 75 MG/1
75 TABLET ORAL
COMMUNITY
Start: 2022-11-19 | End: 2023-07-28

## 2023-01-24 RX ORDER — EVOLOCUMAB 140 MG/ML
140 INJECTION, SOLUTION SUBCUTANEOUS
COMMUNITY
Start: 2022-12-14
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
## 2023-01-24 NOTE — NURSING
Met with patient in multi disciplinary clinic today with Dr. Maki.  Explained my role as navigator.  Reviewed plan of care and answered questions.  Dr. Maki's plan is to follow up with patient at [facility] after his imaging and appt with Dr. Maher in May. He is an established patient of Dr. Maher's for previous cancers.  My contact information was provided and pt was encouraged to call with any questions or concerns.  Pt verbalized an understanding.

## 2023-01-24 NOTE — LETTER
January 24, 2023      Goyo Patiño III  [address]  [facility] [address]           [facility]  [address] [place] [address]  Phone: [phone]  Fax: [phone]          Patient: Goyo Patiño III   MR Number: [id number]   YOB: 1945   Date of Visit: 1/24/2023       Dear Dr. Ashly Galloway:    Thank you for referring Goyo Patiño to me for evaluation.  Evaluated patient with pulmonary nodules noted on CT scan of the chest.  Physical examination is unremarkable, patient reports occasional dyspnea on exertion, CT scans of the chest are reviewed and shows stable oblong nodule of the right upper lobe, stable tree-in-bud changes of right lower lobe, and mild worsening of bibasilar ground-glass opacities.  Patient is scheduled for repeat imaging April or May of 2023 with Dr. Wolfgang Maher, will follow-up with the patient in the office thereafter.    If you have questions, please do not hesitate to call me. I look forward to following Goyo Patiño along with you.    Sincerely,    Addy Maki Jr., DO    Enclosure  CC:  No Recipients    If you would like to receive this communication electronically, please contact [email or web address] or [phone] to request more information on [employer] Link access.    For providers and/or their staff who would like to refer a patient to [facility], please contact us through our one-stop-shop provider referral line, Cah[facility] Blackwell, at [phone].    If you feel you have received this communication in error or would no longer like to receive these types of communications, please e-mail [email or web address]

## 2023-01-24 NOTE — LETTER
January 24, 2023        Sirena Watt MD  [address]  [facility] [address]             [facility]  [address] [facility] [address]  Phone: [phone]  Fax: [phone]   Patient: Goyo Patiño III   MR Number: [id number]   YOB: 1945   Date of Visit: 1/24/2023       Dear Dr. Watt:    Thank you for referring Goyo Patiño to me for evaluation. Below are the relevant portions of my assessment and plan of care.      ICD-10-CM ICD-9-CM   1. Malignant melanoma of skin of scalp and neck  C43.4 172.4   2. Lung nodule seen on imaging study  R91.1 793.11   3. Prostate cancer  C61 185   4. Carcinoid tumor of ileum, unspecified whether malignant  D3A.012 209.43            If you have questions, please do not hesitate to call me. I look forward to following Goyo along with you.    Sincerely,      Addy Maki Jr., DO           CC    No Recipients

## 2024-10-23 ENCOUNTER — PATIENT MESSAGE (OUTPATIENT)
Dept: RESEARCH | Facility: HOSPITAL | Age: 79
End: 2024-10-23
Payer: MEDICARE